# Patient Record
Sex: MALE | Race: BLACK OR AFRICAN AMERICAN | Employment: OTHER | URBAN - METROPOLITAN AREA
[De-identification: names, ages, dates, MRNs, and addresses within clinical notes are randomized per-mention and may not be internally consistent; named-entity substitution may affect disease eponyms.]

---

## 2024-07-21 ENCOUNTER — HOSPITAL ENCOUNTER (EMERGENCY)
Age: 39
Discharge: HOME | End: 2024-07-21

## 2024-07-21 ENCOUNTER — APPOINTMENT (OUTPATIENT)
Dept: GENERAL RADIOLOGY | Age: 39
End: 2024-07-21

## 2024-07-21 VITALS
HEART RATE: 64 BPM | TEMPERATURE: 97.9 F | SYSTOLIC BLOOD PRESSURE: 128 MMHG | RESPIRATION RATE: 17 BRPM | OXYGEN SATURATION: 96 % | HEIGHT: 72 IN | DIASTOLIC BLOOD PRESSURE: 81 MMHG | BODY MASS INDEX: 24.81 KG/M2 | WEIGHT: 183.2 LBS

## 2024-07-21 DIAGNOSIS — G40.909 NONINTRACTABLE EPILEPSY WITHOUT STATUS EPILEPTICUS, UNSPECIFIED EPILEPSY TYPE (HCC): Primary | ICD-10-CM

## 2024-07-21 DIAGNOSIS — Z76.0 ENCOUNTER FOR MEDICATION REFILL: ICD-10-CM

## 2024-07-21 LAB
ANION GAP SERPL CALCULATED.3IONS-SCNC: 9 MMOL/L (ref 3–16)
BASOPHILS # BLD: 0 K/UL (ref 0–0.2)
BASOPHILS NFR BLD: 0.6 %
BILIRUB UR QL STRIP.AUTO: NEGATIVE
BUN SERPL-MCNC: 15 MG/DL (ref 7–20)
CALCIUM SERPL-MCNC: 9.9 MG/DL (ref 8.3–10.6)
CHLORIDE SERPL-SCNC: 101 MMOL/L (ref 99–110)
CLARITY UR: CLEAR
CO2 SERPL-SCNC: 25 MMOL/L (ref 21–32)
COLOR UR: YELLOW
CREAT SERPL-MCNC: 1.1 MG/DL (ref 0.9–1.3)
DEPRECATED RDW RBC AUTO: 13.4 % (ref 12.4–15.4)
EOSINOPHIL # BLD: 0.2 K/UL (ref 0–0.6)
EOSINOPHIL NFR BLD: 2.2 %
GFR SERPLBLD CREATININE-BSD FMLA CKD-EPI: 88 ML/MIN/{1.73_M2}
GLUCOSE SERPL-MCNC: 76 MG/DL (ref 70–99)
GLUCOSE UR STRIP.AUTO-MCNC: NEGATIVE MG/DL
HCT VFR BLD AUTO: 39.8 % (ref 40.5–52.5)
HGB BLD-MCNC: 14.1 G/DL (ref 13.5–17.5)
HGB UR QL STRIP.AUTO: NEGATIVE
KETONES UR STRIP.AUTO-MCNC: NEGATIVE MG/DL
LEUKOCYTE ESTERASE UR QL STRIP.AUTO: NEGATIVE
LEVETIRACETAM SERPL-MCNC: 15.7 UG/ML (ref 6–46)
LYMPHOCYTES # BLD: 3 K/UL (ref 1–5.1)
LYMPHOCYTES NFR BLD: 41.6 %
MCH RBC QN AUTO: 35.6 PG (ref 26–34)
MCHC RBC AUTO-ENTMCNC: 35.3 G/DL (ref 31–36)
MCV RBC AUTO: 100.9 FL (ref 80–100)
MEDICATION DOSE-MCNC: NORMAL
MONOCYTES # BLD: 0.8 K/UL (ref 0–1.3)
MONOCYTES NFR BLD: 11.8 %
NEUTROPHILS # BLD: 3.1 K/UL (ref 1.7–7.7)
NEUTROPHILS NFR BLD: 43.8 %
NITRITE UR QL STRIP.AUTO: NEGATIVE
PH UR STRIP.AUTO: 6 [PH] (ref 5–8)
PLATELET # BLD AUTO: 179 K/UL (ref 135–450)
PMV BLD AUTO: 7.8 FL (ref 5–10.5)
POTASSIUM SERPL-SCNC: 4.5 MMOL/L (ref 3.5–5.1)
PROT UR STRIP.AUTO-MCNC: NEGATIVE MG/DL
RBC # BLD AUTO: 3.95 M/UL (ref 4.2–5.9)
SODIUM SERPL-SCNC: 135 MMOL/L (ref 136–145)
SP GR UR STRIP.AUTO: 1.01 (ref 1–1.03)
UA COMPLETE W REFLEX CULTURE PNL UR: NORMAL
UA DIPSTICK W REFLEX MICRO PNL UR: NORMAL
URN SPEC COLLECT METH UR: NORMAL
UROBILINOGEN UR STRIP-ACNC: 0.2 E.U./DL
VALPROATE SERPL-MCNC: 78.9 UG/ML (ref 50–100)
WBC # BLD AUTO: 7.2 K/UL (ref 4–11)

## 2024-07-21 PROCEDURE — 99285 EMERGENCY DEPT VISIT HI MDM: CPT

## 2024-07-21 PROCEDURE — 93005 ELECTROCARDIOGRAM TRACING: CPT | Performed by: PHYSICIAN ASSISTANT

## 2024-07-21 PROCEDURE — 80177 DRUG SCRN QUAN LEVETIRACETAM: CPT

## 2024-07-21 PROCEDURE — 85025 COMPLETE CBC W/AUTO DIFF WBC: CPT

## 2024-07-21 PROCEDURE — 80235 DRUG ASSAY LACOSAMIDE: CPT

## 2024-07-21 PROCEDURE — 6370000000 HC RX 637 (ALT 250 FOR IP): Performed by: PHYSICIAN ASSISTANT

## 2024-07-21 PROCEDURE — 80048 BASIC METABOLIC PNL TOTAL CA: CPT

## 2024-07-21 PROCEDURE — 81003 URINALYSIS AUTO W/O SCOPE: CPT

## 2024-07-21 PROCEDURE — 80164 ASSAY DIPROPYLACETIC ACD TOT: CPT

## 2024-07-21 PROCEDURE — 71046 X-RAY EXAM CHEST 2 VIEWS: CPT

## 2024-07-21 RX ORDER — LEVETIRACETAM 750 MG/1
750 TABLET ORAL 2 TIMES DAILY
Qty: 60 TABLET | Refills: 1 | Status: SHIPPED | OUTPATIENT
Start: 2024-07-21

## 2024-07-21 RX ORDER — DIVALPROEX SODIUM 500 MG/1
500 TABLET, DELAYED RELEASE ORAL 2 TIMES DAILY
Qty: 60 TABLET | Refills: 1 | Status: SHIPPED | OUTPATIENT
Start: 2024-07-21 | End: 2024-09-19

## 2024-07-21 RX ORDER — LEVETIRACETAM 500 MG/1
1500 TABLET ORAL ONCE
Status: COMPLETED | OUTPATIENT
Start: 2024-07-21 | End: 2024-07-21

## 2024-07-21 RX ORDER — LACOSAMIDE 150 MG/1
150 TABLET ORAL 2 TIMES DAILY
Qty: 60 TABLET | Refills: 1 | Status: SHIPPED | OUTPATIENT
Start: 2024-07-21 | End: 2024-09-19

## 2024-07-21 RX ADMIN — LEVETIRACETAM 1500 MG: 500 TABLET, FILM COATED ORAL at 13:21

## 2024-07-21 NOTE — ED PROVIDER NOTES
[facility] EMERGENCY DEPARTMENT  EMERGENCY DEPARTMENT ENCOUNTER        Pt Name: Les Vásquez  MRN: [id number]  Birthdate 1985  Date of evaluation: 7/21/2024  Provider: Ritu Gee PA-C  PCP: No primary care provider on file.  Note Started: 1:19 PM EDT 7/21/24      LAURIE. I have evaluated this patient.        CHIEF COMPLAINT       Chief Complaint   Patient presents with    Seizures     Epilepsy hx. Pt had a seizure this morning. Pt out of seizure medication for 5 days. Pt fell and hit his head in the shower 10 am and wants examined/restarted on seizure meds.        HISTORY OF PRESENT ILLNESS: 1 or more Elements     History From: patient  Limitations to history : None    Les Vásquez is a 39 y.o. male who presents to the emergency department by private vehicle.  Patient had a fall in the shower this morning.  He hit the side of his forehead and left shoulder when he fell.  He denies loss of consciousness, vision changes, vomiting.  He is not anticoagulated.  Patient states his friend was able to help catch him and he did not fall to the ground.  Patient requesting evaluation regarding fall.  Additionally, patient requesting medication refill.  He recently moved to the [facility].  He has a history of epilepsy and reports he is on Depakote, Keppra, lacosamide for seizures.  He additionally has a vagal nerve stimulator for epilepsy in place.  States battery was replaced about a year ago.  Patient states he has been out of his medications for the past couple days.  Has been trying to stretch his dosages.  He states his neurologist from [place] refill medications, however he has been unable to fill them given cost. He is currently trying to obtain insurance in [place]. There is been no witnessed seizure activities.  No other complaints this time.    Nursing Notes were all reviewed and agreed with or any disagreements were addressed in the HPI.    REVIEW OF SYSTEMS :      Review of

## 2024-07-21 NOTE — ED PROVIDER NOTES
EKG Interpretation #1    Interpreted by emergency department physician  Time performed: 1314  Time read: 1317    Rhythm: Sinus  Ventricular Rate: 62  QRS Axis: 53  Ectopy: None  Conduction: Normal sinus rhythm with LVH by voltage and early repolarization abnormalities resulting in ST segment elevation in V1 only.  ST Segments: Elevation in V1 only  T Waves: normal  Q Waves: None noted    Other findings: None    Compared to EKG on: None to compare    Clinical Impression: Normal sinus rhythm with LVH by voltage and early repolarization abnormalities resulting in ST segment elevation in V1 only.  There is no previous EKG to compare.    DO Waleska SHAIKH Charles K, DO  07/21/24 1404

## 2024-07-21 NOTE — ED TRIAGE NOTES
Epilepsy hx. Pt had a seizure this morning. Pt out of seizure medication for 5 days. Pt fell and hit his head in the shower 10 am and wants examined/restarted on seizure meds.

## 2024-07-21 NOTE — DISCHARGE INSTRUCTIONS
[facility] Referral number [id number] for Primary Care      [facility] CLINICS/COMMUNITY HEALTH CENTERS  [facility]  [address] [address]  [phone]  Fax [phone]  Medical, OB/Gyn, Pediatrics, [facility]  Serves all of [facility] ([facility])  [address]  [place], [place] [address]  [phone]       [facility]  [address] (Administrative offices)  [phone]  Homeless only [facility] ([place])  [address] [place] [address]  [phone] or [phone], Japanese [phone],   Dental Appointments [phone] or [phone]  Pediatric, Family Practice, X-Ray  Serves all of [facility] ([facility]  [address]  [place], [place] [id number] [phone]   [facility] ([facility])   [address] [facility]    (Located in [facility])  [phone] or [phone], Japanese [phone],   Dental Appointments [phone] or [phone]     [facility]  [address] [place], [place] [address]  [phone] [facility]  [address]  [phone]   [facility]  [address]  [id number] [phone] Fax [phone]  General Practice    Serves [place] and Surrounding areas [facility]  [address] [facility] [address]  [phone] Fax [phone]  Medical, OB/Gyn, Pediatrics  Dental Clinic, [facility] only     [facility]  [address] [address]  [phone] Fax [phone]  Family Practice, Pediatrics, OB/Gyn, [facility]  Dental Clinic [phone]  Serves [facility]  [address]    [phone]  Urgent Care, Open 24 hrs, Urgent care, Gyn, Prenatal, Dental Mental, Translators     [facility] [address] [place] [phone]  (Located: [facility] Resource

## 2024-07-22 ENCOUNTER — TELEPHONE (OUTPATIENT)
Dept: SOCIAL WORK | Age: 39
End: 2024-07-22

## 2024-07-22 LAB
EKG ATRIAL RATE: 62 BPM
EKG DIAGNOSIS: NORMAL
EKG P AXIS: 52 DEGREES
EKG P-R INTERVAL: 156 MS
EKG Q-T INTERVAL: 374 MS
EKG QRS DURATION: 78 MS
EKG QTC CALCULATION (BAZETT): 379 MS
EKG R AXIS: 53 DEGREES
EKG T AXIS: 51 DEGREES
EKG VENTRICULAR RATE: 62 BPM

## 2024-07-22 PROCEDURE — 93010 ELECTROCARDIOGRAM REPORT: CPT | Performed by: INTERNAL MEDICINE

## 2024-07-22 NOTE — TELEPHONE ENCOUNTER
Patient called for assistance in locating medical care in [place]- patient has moved to [place] from [facility] , patient has already filed to transfer his medicaid.  Reviewed his After Visit Summary with him AVS.  Patient to call [facility] and [facility], Patient agreed to call both for appt and will call SW back if he needs more assistance.

## 2024-07-24 LAB — LACOSAMIDE SERPL-MCNC: 4.2 UG/ML (ref 1–10)
